# Patient Record
Sex: FEMALE | Race: BLACK OR AFRICAN AMERICAN
[De-identification: names, ages, dates, MRNs, and addresses within clinical notes are randomized per-mention and may not be internally consistent; named-entity substitution may affect disease eponyms.]

---

## 2019-07-24 NOTE — ULT
THYROID ULTRASOUND:

 

HISTORY: 

Hashimoto's thyroiditis.

 

FINDINGS: 

Real-time imaging of the right and left lobes of the thyroid were performed.  The right lobe measures
 1.7 x 1.3 x 4.7 cm and the left lobe 1.1 x 1.3 x 3.6 cm.  No thyroid nodules are identified.

 

IMPRESSION: 

Unremarkable thyroid ultrasound.

 

POS: [facility]

## 2022-12-20 ENCOUNTER — HOSPITAL ENCOUNTER (OUTPATIENT)
Dept: HOSPITAL 92 - ULT | Age: 37
Discharge: HOME | End: 2022-12-20
Attending: INTERNAL MEDICINE
Payer: COMMERCIAL

## 2022-12-20 DIAGNOSIS — E06.3: Primary | ICD-10-CM

## 2022-12-20 PROCEDURE — 76536 US EXAM OF HEAD AND NECK: CPT
